# Patient Record
Sex: FEMALE | Race: WHITE | NOT HISPANIC OR LATINO | Employment: UNEMPLOYED | URBAN - METROPOLITAN AREA
[De-identification: names, ages, dates, MRNs, and addresses within clinical notes are randomized per-mention and may not be internally consistent; named-entity substitution may affect disease eponyms.]

---

## 2020-03-05 ENCOUNTER — TRANSCRIBE ORDERS (OUTPATIENT)
Dept: WOMENS IMAGING | Facility: HOSPITAL | Age: 30
End: 2020-03-05

## 2020-03-05 DIAGNOSIS — O28.3 ABNORMAL FETAL ULTRASOUND: Primary | ICD-10-CM

## 2020-03-05 DIAGNOSIS — O35.06X0 HYDROCEPHALUS OF FETUS AFFECTING MANAGEMENT OF MOTHER IN SINGLETON PREGNANCY: ICD-10-CM

## 2020-03-19 ENCOUNTER — OFFICE VISIT (OUTPATIENT)
Dept: OBSTETRICS AND GYNECOLOGY | Facility: HOSPITAL | Age: 30
End: 2020-03-19

## 2020-03-19 ENCOUNTER — HOSPITAL ENCOUNTER (OUTPATIENT)
Dept: WOMENS IMAGING | Facility: HOSPITAL | Age: 30
Discharge: HOME | End: 2020-03-19
Admitting: OBSTETRICS & GYNECOLOGY

## 2020-03-19 VITALS
SYSTOLIC BLOOD PRESSURE: 121 MMHG | WEIGHT: 276 LBS | BODY MASS INDEX: 40.88 KG/M2 | HEIGHT: 69 IN | DIASTOLIC BLOOD PRESSURE: 71 MMHG

## 2020-03-19 DIAGNOSIS — Z34.90 PREGNANCY, UNSPECIFIED GESTATIONAL AGE: Primary | ICD-10-CM

## 2020-03-19 DIAGNOSIS — O35.06X0 HYDROCEPHALUS OF FETUS AFFECTING MANAGEMENT OF MOTHER IN SINGLETON PREGNANCY: ICD-10-CM

## 2020-03-19 DIAGNOSIS — O28.3 ABNORMAL FETAL ULTRASOUND: ICD-10-CM

## 2020-03-19 DIAGNOSIS — Z03.73 FETAL ANOMALY SUSPECTED BUT NOT FOUND: ICD-10-CM

## 2020-03-19 PROBLEM — E07.9 DISEASE OF THYROID GLAND: Status: ACTIVE | Noted: 2020-03-19

## 2020-03-19 PROCEDURE — 76817 TRANSVAGINAL US OBSTETRIC: CPT | Performed by: OBSTETRICS & GYNECOLOGY

## 2020-03-19 PROCEDURE — 76817 TRANSVAGINAL US OBSTETRIC: CPT

## 2020-03-19 RX ORDER — SERTRALINE HYDROCHLORIDE 100 MG/1
175 TABLET, FILM COATED ORAL DAILY
COMMUNITY
Start: 2020-03-13

## 2020-03-19 RX ORDER — LEVOTHYROXINE SODIUM 0.03 MG/1
TABLET ORAL
COMMUNITY
Start: 2020-02-25

## 2020-03-19 RX ORDER — CYANOCOBALAMIN 1000 UG/ML
INJECTION, SOLUTION INTRAMUSCULAR; SUBCUTANEOUS
COMMUNITY
Start: 2020-02-17

## 2020-03-19 RX ORDER — PRENATAL VIT/IRON FUM/FOLIC AC 27MG-0.8MG
1 TABLET ORAL DAILY
COMMUNITY
Start: 2020-02-25

## 2020-03-19 RX ORDER — FOLIC ACID 1 MG/1
3000 TABLET ORAL DAILY
COMMUNITY
Start: 2020-02-26

## 2020-03-19 NOTE — PROGRESS NOTES
Documentation of the ultasound findings, images, and interpretations with be available in the patient's Viewpoint report located in the Chart Review Imaging tab in [employer].

## 2020-03-19 NOTE — PROGRESS NOTES
Pt denies lof, vag bleeding or regular cramping. Has not had genetic testing done yet. Pt states provider is working on insurance coverage for this.

## 2020-04-30 ENCOUNTER — APPOINTMENT (OUTPATIENT)
Dept: WOMENS IMAGING | Facility: HOSPITAL | Age: 30
End: 2020-04-30